# Patient Record
Sex: MALE | Race: WHITE | Employment: PART TIME | URBAN - METROPOLITAN AREA
[De-identification: names, ages, dates, MRNs, and addresses within clinical notes are randomized per-mention and may not be internally consistent; named-entity substitution may affect disease eponyms.]

---

## 2018-12-01 ENCOUNTER — HOSPITAL ENCOUNTER (EMERGENCY)
Facility: CLINIC | Age: 73
Discharge: HOME | End: 2018-12-02
Attending: EMERGENCY MEDICINE | Admitting: EMERGENCY MEDICINE
Payer: MEDICARE

## 2018-12-01 DIAGNOSIS — S00.432A HEMATOMA OF LEFT PINNA, INITIAL ENCOUNTER: ICD-10-CM

## 2018-12-01 DIAGNOSIS — S01.312A LACERATION OF AURICLE OF LEFT EAR, INITIAL ENCOUNTER: ICD-10-CM
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
## 2018-12-01 DIAGNOSIS — S00.83XA CONTUSION OF FACE, SCALP AND NECK, INITIAL ENCOUNTER: ICD-10-CM

## 2018-12-01 DIAGNOSIS — S00.03XA CONTUSION OF FACE, SCALP AND NECK, INITIAL ENCOUNTER: ICD-10-CM

## 2018-12-01 DIAGNOSIS — S09.90XA CLOSED HEAD INJURY, INITIAL ENCOUNTER: ICD-10-CM

## 2018-12-01 DIAGNOSIS — S39.012A STRAIN OF LUMBAR REGION, INITIAL ENCOUNTER: ICD-10-CM

## 2018-12-01 DIAGNOSIS — S10.93XA CONTUSION OF FACE, SCALP AND NECK, INITIAL ENCOUNTER: ICD-10-CM

## 2018-12-01 LAB
ANION GAP SERPL CALCULATED.3IONS-SCNC: 7 MMOL/L (ref 3–14)
BASOPHILS # BLD AUTO: 0 10E9/L (ref 0–0.2)
BASOPHILS NFR BLD AUTO: 0.3 %
BUN SERPL-MCNC: 22 MG/DL (ref 7–30)
CALCIUM SERPL-MCNC: 8.7 MG/DL (ref 8.5–10.1)
CHLORIDE SERPL-SCNC: 105 MMOL/L (ref 94–109)
CO2 SERPL-SCNC: 26 MMOL/L (ref 20–32)
CREAT SERPL-MCNC: 1.45 MG/DL (ref 0.66–1.25)
DIFFERENTIAL METHOD BLD: ABNORMAL
EOSINOPHIL # BLD AUTO: 0.1 10E9/L (ref 0–0.7)
EOSINOPHIL NFR BLD AUTO: 2.3 %
ERYTHROCYTE [DISTWIDTH] IN BLOOD BY AUTOMATED COUNT: 12.3 % (ref 10–15)
ETHANOL SERPL-MCNC: 0.18 G/DL
GFR SERPL CREATININE-BSD FRML MDRD: 48 ML/MIN/1.7M2
GLUCOSE SERPL-MCNC: 99 MG/DL (ref 70–99)
HCT VFR BLD AUTO: 37.2 % (ref 40–53)
HGB BLD-MCNC: 12.8 G/DL (ref 13.3–17.7)
IMM GRANULOCYTES # BLD: 0 10E9/L (ref 0–0.4)
IMM GRANULOCYTES NFR BLD: 0.3 %
LYMPHOCYTES # BLD AUTO: 2.6 10E9/L (ref 0.8–5.3)
LYMPHOCYTES NFR BLD AUTO: 42.6 %
MCH RBC QN AUTO: 32.8 PG (ref 26.5–33)
MCHC RBC AUTO-ENTMCNC: 34.4 G/DL (ref 31.5–36.5)
MCV RBC AUTO: 95 FL (ref 78–100)
MONOCYTES # BLD AUTO: 0.6 10E9/L (ref 0–1.3)
MONOCYTES NFR BLD AUTO: 10 %
NEUTROPHILS # BLD AUTO: 2.7 10E9/L (ref 1.6–8.3)
NEUTROPHILS NFR BLD AUTO: 44.5 %
NRBC # BLD AUTO: 0 10*3/UL
NRBC BLD AUTO-RTO: 0 /100
PLATELET # BLD AUTO: 176 10E9/L (ref 150–450)
POTASSIUM SERPL-SCNC: 4.1 MMOL/L (ref 3.4–5.3)
RBC # BLD AUTO: 3.9 10E12/L (ref 4.4–5.9)
SODIUM SERPL-SCNC: 138 MMOL/L (ref 133–144)
WBC # BLD AUTO: 6.1 10E9/L (ref 4–11)

## 2018-12-01 PROCEDURE — 99285 EMERGENCY DEPT VISIT HI MDM: CPT | Mod: 25

## 2018-12-01 PROCEDURE — 86900 BLOOD TYPING SEROLOGIC ABO: CPT | Performed by: EMERGENCY MEDICINE

## 2018-12-01 PROCEDURE — 76705 ECHO EXAM OF ABDOMEN: CPT

## 2018-12-01 PROCEDURE — 86901 BLOOD TYPING SEROLOGIC RH(D): CPT | Performed by: EMERGENCY MEDICINE

## 2018-12-01 PROCEDURE — 80048 BASIC METABOLIC PNL TOTAL CA: CPT | Performed by: EMERGENCY MEDICINE

## 2018-12-01 PROCEDURE — 12011 RPR F/E/E/N/L/M 2.5 CM/<: CPT | Mod: LT

## 2018-12-01 PROCEDURE — 80320 DRUG SCREEN QUANTALCOHOLS: CPT | Performed by: EMERGENCY MEDICINE

## 2018-12-01 PROCEDURE — 85610 PROTHROMBIN TIME: CPT | Performed by: EMERGENCY MEDICINE

## 2018-12-01 PROCEDURE — 86850 RBC ANTIBODY SCREEN: CPT | Performed by: EMERGENCY MEDICINE

## 2018-12-01 PROCEDURE — 25000128 H RX IP 250 OP 636: Performed by: EMERGENCY MEDICINE

## 2018-12-01 PROCEDURE — 85025 COMPLETE CBC W/AUTO DIFF WBC: CPT | Performed by: EMERGENCY MEDICINE

## 2018-12-01 PROCEDURE — 96361 HYDRATE IV INFUSION ADD-ON: CPT

## 2018-12-01 RX ORDER — FENTANYL CITRATE 50 UG/ML
25 INJECTION, SOLUTION INTRAMUSCULAR; INTRAVENOUS ONCE
Status: COMPLETED | OUTPATIENT
Start: 2018-12-02 | End: 2018-12-02

## 2018-12-01 RX ADMIN — SODIUM CHLORIDE 1000 ML: 9 INJECTION, SOLUTION INTRAVENOUS at 23:25

## 2018-12-01 NOTE — ED AVS SNAPSHOT
Emergency Department    [address] [place] [address]    Phone:  [phone]    Fax:  [phone]                                       Bebeto Cam   MRN: [id number]    Department:   Emergency Department   Date of Visit:  12/1/2018           After Visit Summary Signature Page     I have received my discharge instructions, and my questions have been answered. I have discussed any challenges I see with this plan with the nurse or doctor.    ..........................................................................................................................................  Patient/Patient Representative Signature      ..........................................................................................................................................  Patient Representative Print Name and Relationship to Patient    ..................................................               ................................................  Date                                   Time    ..........................................................................................................................................  Reviewed by Signature/Title    ...................................................              ..............................................  Date                                               Time          22EPIC Rev 08/18

## 2018-12-01 NOTE — ED AVS SNAPSHOT
Emergency Department    [address] [facility] [address]    Phone:  [phone]    Fax:  [phone]                                       Bebeto Cam   MRN: [id number]    Department:   Emergency Department   Date of Visit:  12/1/2018           Patient Information     Date Of Birth          1945        Your diagnoses for this visit were:     Contusion of face, scalp and neck, initial encounter     Closed head injury, initial encounter     Hematoma of left pinna, initial encounter     Laceration of auricle of left ear, initial encounter     Strain of lumbar region, initial encounter     Fall down stairs, initial encounter        You were seen by Tien Hussein MD.      Follow-up Information     Schedule an appointment as soon as possible for a visit with Clinic, [facility].    Why:  Follow up fall downstairs     Contact information:    [address] [facility] [address] [phone]          Follow up with  Emergency Department.    Specialty:  EMERGENCY MEDICINE    Why:  As needed, If symptoms worsen    Contact information:    [address] [facility] [address] [phone]        Follow up with Laron Marinelli MD. Schedule an appointment as soon as possible for a visit in 2 days.    Specialty:  Otolaryngology    Why:  Follow up ear hematoma.     Contact information:    [facility]  [address] [id number] [phone]          Discharge Instructions       You can expect to be more sore later today.  You should take Tylenol, 1000 mg every 6 hours along with 4-600 mg of ibuprofen every 6 hours for your pain.  You can add in the oxycodone for severe pain on top of this.  He should return the emergency department if you have worsening pain, or pain not improved by the above therapy.  You should also return if you develop worsening abdominal pain, chest pain or shortness of breath.    24 Hour Appointment  Hotline       To make an appointment at any [facility], call [phone] ([phone]). If you don't have a family doctor or clinic, we will help you find one. [facility] clinics are conveniently located to serve the needs of you and your family.             Review of your medicines      START taking        Dose / Directions Last dose taken    oxyCODONE 5 MG tablet   Commonly known as:  ROXICODONE   Dose:  5 mg   Quantity:  12 tablet        Take 1 tablet (5 mg) by mouth every 6 hours as needed for pain   Refills:  0          Our records show that you are taking the medicines listed below. If these are incorrect, please call your family doctor or clinic.        Dose / Directions Last dose taken    BENADRYL PO        Refills:  0                Information about OPIOIDS     PRESCRIPTION OPIOIDS: WHAT YOU NEED TO KNOW   We gave you an opioid (narcotic) pain medicine. It is important to manage your pain, but opioids are not always the best choice. You should first try all the other options your care team gave you. Take this medicine for as short a time (and as few doses) as possible.    Some activities can increase your pain, such as bandage changes or therapy sessions. It may help to take your pain medicine 30 to 60 minutes before these activities. Reduce your stress by getting enough sleep, working on hobbies you enjoy and practicing relaxation or meditation. Talk to your care team about ways to manage your pain beyond prescription opioids.    These medicines have risks:    DO NOT drive when on new or higher doses of pain medicine. These medicines can affect your alertness and reaction times, and you could be arrested for driving under the influence (DUI). If you need to use opioids long-term, talk to your care team about driving.    DO NOT operate heavy machinery    DO NOT do any other dangerous activities while taking these medicines.    DO NOT drink any alcohol while taking these medicines.     If the  opioid prescribed includes acetaminophen, DO NOT take with any other medicines that contain acetaminophen. Read all labels carefully. Look for the word  acetaminophen  or  Tylenol.  Ask your pharmacist if you have questions or are unsure.    You can get addicted to pain medicines, especially if you have a history of addiction (chemical, alcohol or substance dependence). Talk to your care team about ways to reduce this risk.    All opioids tend to cause constipation. Drink plenty of water and eat foods that have a lot of fiber, such as fruits, vegetables, prune juice, apple juice and high-fiber cereal. Take a laxative (Miralax, milk of magnesia, Colace, Senna) if you don t move your bowels at least every other day. Other side effects include upset stomach, sleepiness, dizziness, throwing up, tolerance (needing more of the medicine to have the same effect), physical dependence and slowed breathing.    Store your pills in a secure place, locked if possible. We will not replace any lost or stolen medicine. If you don t finish your medicine, please throw away (dispose) as directed by your pharmacist. The [place] [employer] has more information about safe disposal: https://www.pca.[facility].[place].us/living-green/managing-unwanted-medications        Prescriptions were sent or printed at these locations (1 Prescription)                   Other Prescriptions                Printed at Department/Unit printer (1 of 1)         oxyCODONE (ROXICODONE) 5 MG tablet                Procedures and tests performed during your visit     ABO/Rh type and screen    Alcohol level blood    Basic metabolic panel    CBC with platelets differential    CT Chest/Abdomen/Pelvis w Contrast    CT Thoracic Spine w/o Contrast    Cervical spine CT w/o contrast    Head CT w/o contrast    INR    ISTAT creatinine nursing POCT    Lumbar spine CT w/o contrast    POC US ABDOMEN LIMITED      Orders Needing Specimen Collection     None      Pending  Results     Date and Time Order Name Status Description    12/1/2018 2308 POC US ABDOMEN LIMITED In process             Pending Culture Results     No orders found for last 3 day(s).            Pending Results Instructions     If you had any lab results that were not finalized at the time of your Discharge, you can call the ED Lab Result RN at [phone]. You will be contacted by this team for any positive Lab results or changes in treatment. The nurses are available 7 days a week from 10A to 6:30P.  You can leave a message 24 hours per day and they will return your call.        Test Results From Your Hospital Stay        12/1/2018 11:50 PM      Component Results     Component Value Ref Range & Units Status    Ethanol g/dL 0.18 (H) <0.01 g/dL Final         12/1/2018 11:34 PM      Component Results     Component Value Ref Range & Units Status    WBC 6.1 4.0 - 11.0 10e9/L Final    RBC Count 3.90 (L) 4.4 - 5.9 10e12/L Final    Hemoglobin 12.8 (L) 13.3 - 17.7 g/dL Final    Hematocrit 37.2 (L) 40.0 - 53.0 % Final    MCV 95 78 - 100 fl Final    MCH 32.8 26.5 - 33.0 pg Final    MCHC 34.4 31.5 - 36.5 g/dL Final    RDW 12.3 10.0 - 15.0 % Final    Platelet Count 176 150 - 450 10e9/L Final    Diff Method Automated Method  Final    % Neutrophils 44.5 % Final    % Lymphocytes 42.6 % Final    % Monocytes 10.0 % Final    % Eosinophils 2.3 % Final    % Basophils 0.3 % Final    % Immature Granulocytes 0.3 % Final    Nucleated RBCs 0 0 /100 Final    Absolute Neutrophil 2.7 1.6 - 8.3 10e9/L Final    Absolute Lymphocytes 2.6 0.8 - 5.3 10e9/L Final    Absolute Monocytes 0.6 0.0 - 1.3 10e9/L Final    Absolute Eosinophils 0.1 0.0 - 0.7 10e9/L Final    Absolute Basophils 0.0 0.0 - 0.2 10e9/L Final    Abs Immature Granulocytes 0.0 0 - 0.4 10e9/L Final    Absolute Nucleated RBC 0.0  Final         12/2/2018 12:12 AM      Component Results     Component Value Ref Range & Units Status    ABO O  Final    RH(D) Neg  Final    Antibody Screen Neg   Final    Test Valid Only At [facility]     Final    Specimen Expires 12/04/2018  Final         12/1/2018 11:50 PM      Component Results     Component Value Ref Range & Units Status    Sodium 138 133 - 144 mmol/L Final    Potassium 4.1 3.4 - 5.3 mmol/L Final    Chloride 105 94 - 109 mmol/L Final    Carbon Dioxide 26 20 - 32 mmol/L Final    Anion Gap 7 3 - 14 mmol/L Final    Glucose 99 70 - 99 mg/dL Final    Urea Nitrogen 22 7 - 30 mg/dL Final    Creatinine 1.45 (H) 0.66 - 1.25 mg/dL Final    GFR Estimate 48 (L) >60 mL/min/1.7m2 Final    Non  GFR Calc    GFR Estimate If Black 58 (L) >60 mL/min/1.7m2 Final    African American GFR Calc    Calcium 8.7 8.5 - 10.1 mg/dL Final         12/1/2018 11:08 PM      Result not yet available     Exam Begun         12/2/2018  6:10 AM      Narrative     CT SCAN OF THE HEAD WITHOUT CONTRAST   12/2/2018 12:58 AM     HISTORY: Fall.    TECHNIQUE:  Axial images of the head and coronal reformations without  IV contrast material. Radiation dose for this scan was reduced using  automated exposure control, adjustment of the mA and/or kV according  to patient size, or iterative reconstruction technique.    COMPARISON: None.    FINDINGS:  There is generalized atrophy of the brain.  There is low  attenuation in the white matter of the cerebral hemispheres consistent  with sequelae of small vessel ischemic disease. There is no evidence  of intracranial hemorrhage, mass, acute infarct or anomaly.     The visualized portions of the sinuses and mastoids appear normal.  Large left scalp hematoma. No bone fracture.        Impression     IMPRESSION: No acute brain abnormality.      NICHO SANDOVAL MD         12/2/2018  6:12 AM      Narrative     CT CERVICAL SPINE WITHOUT CONTRAST  12/2/2018 12:58 AM      HISTORY: Fall.    TECHNIQUE: Multiplanar imaging of the cervical spine without  intravenous contrast. Radiation dose for this scan was reduced using  automated exposure control, adjustment  of the mA and/or kV according  to patient size, or iterative reconstruction technique.     COMPARISON:  None.    FINDINGS: There is no acute fracture or traumatic malalignment. There  is multilevel degenerative disc and facet disease. There is no  significant spinal stenosis. The paraspinal soft tissues show no acute  abnormalities. There is atherosclerotic calcification of the carotid  arterial system. Scarring at the lung apices.        Impression     IMPRESSION: No acute fracture or malalignment.    NICHO SANDOVAL MD         12/2/2018  6:11 AM      Narrative     CT CHEST/ABDOMEN/PELVIS WITH CONTRAST  12/2/2018 1:16 AM    HISTORY:  Fall, left flank pain.    TECHNIQUE: CT scan obtained of the chest, abdomen, and pelvis with  oral and IV contrast. 72 mL Isovue-370 injected. Radiation dose for  this scan was reduced using automated exposure control, adjustment of  the mA and/or kV according to patient size, or iterative  reconstruction technique.    COMPARISON:  11/2/2005.    FINDINGS:    Chest: There is scarring at the lung apices. Dependent atelectasis  bilaterally. No pneumothorax or pleural effusion. There is no  mediastinal, hilar or axillary lymph node enlargement. There are  coronary artery atherosclerotic calcifications. The heart is at the  upper limits of normal in size.    ABDOMEN: There is diffuse fatty infiltration of the liver. Small  probable cysts in the right lobe of the liver near the gallbladder  fossa. The portal vein is patent. The gallbladder, spleen, pancreas  and adrenal glands are normal in appearance. There are parapelvic  cysts in both kidneys. Tiny cortical cyst in the mid right kidney.  There are several intrarenal stones bilaterally. The largest is in the  lower pole of the left kidney measuring 0.5 cm. There is no ureteral  stone or hydronephrosis on either side. There is no abdominal or  pelvic lymph node enlargement. There is atherosclerotic calcification  of the aorta and its  branches. No aneurysm.    Pelvis: There are colonic diverticula without acute diverticulitis. No  bowel obstruction or inflammation. The urinary bladder is  well-distended and appears normal. There are prostatic calcifications.  A few pelvic phleboliths. No abdominal wall hernia. There is no acute  bone fracture. Degenerative disease in the spine and hips.        Impression     IMPRESSION:  1. No acute traumatic abnormality.  2. Coronary artery atherosclerotic calcifications.  3. Bilateral renal stones. No ureteral stone or hydronephrosis.  4. Fatty infiltration of the liver.  5. Colonic diverticula without acute diverticulitis.     NICHO SANDOVAL MD         12/2/2018  6:08 AM      Narrative     CT THORACIC SPINE WITHOUT CONTRAST  12/2/2018 1:33 AM      HISTORY: Fall.    TECHNIQUE: Multiplanar imaging of the thoracic spine without  intravenous contrast. Radiation dose for this scan was reduced using  automated exposure control, adjustment of the mA and/or kV according  to patient size, or iterative reconstruction technique.     COMPARISON:  None.    FINDINGS: There is no acute fracture or traumatic malalignment. There  is mild multilevel degenerative disease. The paraspinal soft tissues  show no acute abnormalities. There is dependent atelectasis in the  visualized lungs.        Impression     IMPRESSION: No acute fracture or malalignment.    NICHO SANDOVAL MD         12/2/2018  6:08 AM      Narrative     CT LUMBAR SPINE WITHOUT CONTRAST  12/2/2018 1:36 AM      HISTORY: Fall.    TECHNIQUE: Multiplanar imaging of the lumbar spine without intravenous  contrast. Radiation dose for this scan was reduced using automated  exposure control, adjustment of the mA and/or kV according to patient  size, or iterative reconstruction technique.     COMPARISON:  None.    FINDINGS: There is no acute fracture or malalignment. There is  multilevel degenerative disc and facet disease. There is no  significant spinal stenosis. There  is a central disc bulge at L5-S1.  There is degenerative disease in the SI joints bilaterally with fusion  at the joint superiorly. The paraspinal soft tissues show no acute  abnormalities. There are bilateral intrarenal stones and parapelvic  cysts.        Impression     IMPRESSION: No acute traumatic abnormality.    NICHO SANDOVAL MD         12/2/2018 12:14 AM      Component Results     Component Value Ref Range & Units Status    INR 1.00 0.86 - 1.14 Final                Clinical Quality Measure: Blood Pressure Screening     Your blood pressure was checked while you were in the emergency department today. The last reading we obtained was  BP: 160/76 . Please read the guidelines below about what these numbers mean and what you should do about them.  If your systolic blood pressure (the top number) is less than 120 and your diastolic blood pressure (the bottom number) is less than 80, then your blood pressure is normal. There is nothing more that you need to do about it.  If your systolic blood pressure (the top number) is 120-139 or your diastolic blood pressure (the bottom number) is 80-89, your blood pressure may be higher than it should be. You should have your blood pressure rechecked within a year by a primary care provider.  If your systolic blood pressure (the top number) is 140 or greater or your diastolic blood pressure (the bottom number) is 90 or greater, you may have high blood pressure. High blood pressure is treatable, but if left untreated over time it can put you at risk for heart attack, stroke, or kidney failure. You should have your blood pressure rechecked by a primary care provider within the next 4 weeks.  If your provider in the emergency department today gave you specific instructions to follow-up with your doctor or provider even sooner than that, you should follow that instruction and not wait for up to 4 weeks for your follow-up visit.        Thank you for choosing [facility]       Thank  "you for choosing [facility] for your care. Our goal is always to provide you with excellent care. Hearing back from our patients is one way we can continue to improve our services. Please take a few minutes to complete the written survey that you may receive in the mail after you visit with us. Thank you!        [employer] Information     [employer] lets you send messages to your doctor, view your test results, renew your prescriptions, schedule appointments and more. To sign up, go to [email or web address] . Click on \"Log in\" on the left side of the screen, which will take you to the Welcome page. Then click on \"Sign up Now\" on the right side of the page.     You will be asked to enter the access code listed below, as well as some personal information. Please follow the directions to create your username and password.     Your access code is: [email or web address]  Expires: 3/2/2019  2:22 AM     Your access code will  in 90 days. If you need help or a new code, please call your [facility] clinic or [phone].        Care EveryWhere ID     This is your Care EveryWhere ID. This could be used by other organizations to access your [facility] medical records  [id number]        Equal Access to Services     ELLIE AGUILAR : Rachael Purvis, wageraldda radha, qaybta kaalmada adeabiola, marnie toney. So [facility] [phone].    ATENCIÓN: Si habla español, tiene a flores disposición servicios gratuitos de asistencia lingüística. Llame al [phone].    We comply with applicable federal civil rights laws and [place] laws. We do not discriminate on the basis of race, color, national origin, age, disability, sex, sexual orientation, or gender identity.            After Visit Summary       This is your record. Keep this with you and show to your community pharmacist(s) and doctor(s) at your next visit.                  "

## 2018-12-02 ENCOUNTER — APPOINTMENT (OUTPATIENT)
Dept: CT IMAGING | Facility: CLINIC | Age: 73
End: 2018-12-02
Attending: EMERGENCY MEDICINE
Payer: MEDICARE

## 2018-12-02 VITALS
HEART RATE: 62 BPM | DIASTOLIC BLOOD PRESSURE: 76 MMHG | OXYGEN SATURATION: 97 % | HEIGHT: 67 IN | SYSTOLIC BLOOD PRESSURE: 160 MMHG | TEMPERATURE: 98.3 F | WEIGHT: 164 LBS | RESPIRATION RATE: 20 BRPM | BODY MASS INDEX: 25.74 KG/M2

## 2018-12-02 LAB
ABO + RH BLD: NORMAL
ABO + RH BLD: NORMAL
BLD GP AB SCN SERPL QL: NORMAL
BLOOD BANK CMNT PATIENT-IMP: NORMAL
INR PPP: 1 (ref 0.86–1.14)
SPECIMEN EXP DATE BLD: NORMAL

## 2018-12-02 PROCEDURE — 70450 CT HEAD/BRAIN W/O DYE: CPT

## 2018-12-02 PROCEDURE — 25000132 ZZH RX MED GY IP 250 OP 250 PS 637: Mod: GY | Performed by: EMERGENCY MEDICINE

## 2018-12-02 PROCEDURE — 96374 THER/PROPH/DIAG INJ IV PUSH: CPT | Mod: 59

## 2018-12-02 PROCEDURE — 72131 CT LUMBAR SPINE W/O DYE: CPT

## 2018-12-02 PROCEDURE — 72125 CT NECK SPINE W/O DYE: CPT

## 2018-12-02 PROCEDURE — 25000128 H RX IP 250 OP 636: Performed by: EMERGENCY MEDICINE

## 2018-12-02 PROCEDURE — 72128 CT CHEST SPINE W/O DYE: CPT

## 2018-12-02 PROCEDURE — 74177 CT ABD & PELVIS W/CONTRAST: CPT

## 2018-12-02 PROCEDURE — A9270 NON-COVERED ITEM OR SERVICE: HCPCS | Mod: GY | Performed by: EMERGENCY MEDICINE

## 2018-12-02 PROCEDURE — 25000125 ZZHC RX 250: Performed by: EMERGENCY MEDICINE

## 2018-12-02 RX ORDER — OXYCODONE HYDROCHLORIDE 5 MG/1
5 TABLET ORAL EVERY 6 HOURS PRN
Qty: 12 TABLET | Refills: 0 | Status: SHIPPED | OUTPATIENT
Start: 2018-12-02

## 2018-12-02 RX ORDER — OXYCODONE HYDROCHLORIDE 5 MG/1
5 TABLET ORAL ONCE
Status: COMPLETED | OUTPATIENT
Start: 2018-12-02 | End: 2018-12-02

## 2018-12-02 RX ORDER — ACETAMINOPHEN 325 MG/1
975 TABLET ORAL ONCE
Status: COMPLETED | OUTPATIENT
Start: 2018-12-02 | End: 2018-12-02

## 2018-12-02 RX ORDER — IOPAMIDOL 755 MG/ML
72 INJECTION, SOLUTION INTRAVASCULAR ONCE
Status: COMPLETED | OUTPATIENT
Start: 2018-12-02 | End: 2018-12-02

## 2018-12-02 RX ORDER — IBUPROFEN 600 MG/1
600 TABLET, FILM COATED ORAL ONCE
Status: COMPLETED | OUTPATIENT
Start: 2018-12-02 | End: 2018-12-02

## 2018-12-02 RX ADMIN — ACETAMINOPHEN 975 MG: 325 TABLET, FILM COATED ORAL at 04:10

## 2018-12-02 RX ADMIN — IOPAMIDOL 72 ML: 755 INJECTION, SOLUTION INTRAVENOUS at 01:09

## 2018-12-02 RX ADMIN — IBUPROFEN 600 MG: 600 TABLET, FILM COATED ORAL at 04:10

## 2018-12-02 RX ADMIN — FENTANYL CITRATE 25 MCG: 50 INJECTION, SOLUTION INTRAMUSCULAR; INTRAVENOUS at 00:05

## 2018-12-02 RX ADMIN — SODIUM CHLORIDE 96 ML: 9 INJECTION, SOLUTION INTRAVENOUS at 01:09

## 2018-12-02 RX ADMIN — OXYCODONE HYDROCHLORIDE 5 MG: 5 TABLET ORAL at 04:10

## 2018-12-02 ASSESSMENT — ENCOUNTER SYMPTOMS
WOUND: 1
BACK PAIN: 1
ARTHRALGIAS: 1
MYALGIAS: 1
HEADACHES: 1

## 2018-12-02 NOTE — ED NOTES
Per ERT, pt ambulated fairly well.  Needed some assistance.  Had some back pain at the end of the walk.

## 2018-12-02 NOTE — ED PROVIDER NOTES
"  History     Chief Complaint:  Fall    HPI   Bebeto Cam is a 73 year old male with a complex medical history who presents to the ED via EMS for evaluation after a fall. The patient states that he was drinking this evening, and per report, fell down approximately 12 steps, which were noted to be steep, sustaining injury to his head and lower back. Witnesses at bedside state that the patient was unconscious for the duration of a minute. He is alert and verbal in the ED. The patient presents with a laceration to his left ear, and endorses head pain, left shoulder pain as well as severe lower left back pain. He is an otherwise poor historian, and in the room can recall very few of the details, and repeating the words \" I am a mess.\" Of note the patient takes a daily aspirin.     Allergies:  Vicodin [Hydrocodone-Acetaminophen]    Medications:    Aspirin  Revatio  Celexa  Zestril  Ativan  Zocor  Prevacid    Past Medical History:    Anxiety  Hypertension  Hyperlipidemia  Lumbago  GERD  Left ventricular hypertrophy    Past Surgical History:    Nasal septum   Shoulder  Knee surgery  Adenoidectomy  Tonsillectomy     Family History:    History reviewed. No pertinent family history.     Social History:  The patient was accompanied to the ED by carlin.  Smoking Status: Never  Smokeless Tobacco: Never  Alcohol Use: Yes  Marital Status:  Single [1]     Review of Systems   Musculoskeletal: Positive for arthralgias, back pain and myalgias.   Skin: Positive for wound.   Neurological: Positive for syncope and headaches.   All other systems reviewed and are negative.    Physical Exam     Patient Vitals for the past 24 hrs:   BP Temp Temp src Pulse Heart Rate Resp SpO2 Height Weight   12/02/18 0321 160/76 - - - - - 97 % - -   12/02/18 0315 - - - - - - 97 % - -   12/02/18 0245 - - - - - - 97 % - -   12/02/18 0215 - - - - - - 96 % - -   12/02/18 0200 - - - - - - 98 % - -   12/02/18 0130 - - - - - - 97 % - -   12/02/18 0115 - - - - - " "- 99 % - -   12/02/18 0111 134/66 - - - 75 20 95 % - -   12/02/18 0108 134/66 - - - - - 95 % - -   12/01/18 2355 140/69 - - - - - 97 % - -   12/01/18 2345 - - - - - - 99 % - -   12/01/18 2315 - - - - - - 98 % - -   12/01/18 2302 133/77 98.3  F (36.8  C) Oral 62 62 20 98 % - -   12/01/18 2256 - - - - - - - 1.702 m (5' 7\") 74.4 kg (164 lb)     Physical Exam  Constitutional: Alert, attentive, GCS 15  HENT:    Head: Left lateral parietal scalp hematoma, no laceration   Ears: no hemotympanum, 2 cm posterior left auricular laceration with associated auricular hematoma   Nose: Nose normal.    Mouth/Throat: Oropharynx is clear, mucous membranes are moist, no blood in oral cavity, no dental subluxation  Neck:  no midline tenderness, ROM full  Eyes: EOM are normal, conjugate gaze, pupils symmetric reactive, nystagmus noted  CV: regular rate and rhythm; no murmurs  Chest: Effort normal and breath sounds normal, symmetric bilaterally.  No crepitus  GI:  Non-tender without guarding or rebound  Back: No T or L spine tenderness, no step offs, diffuse lumbar paraspinal muscle tenderness without contusions or abrasion  MSK: No long bone tenderness or deformities.  Muscle compartments soft.   Neurological: Alert, attentive.  and plantar strength 5/5.  Sensation intact to light touch in in distal BLE and BUE.  Mildly slurred speech  Skin: Skin is warm and dry.      Emergency Department Course     Imaging:  Radiology findings were communicated with the patient who voiced understanding of the findings.  Head CT w/o Contrast:  IMPRESSION: No acute brain abnormality.      Per radiology    Cervical Spine CT w/o Contrast:  IMPRESSION: No acute fracture or malalignment.    Per radiology    CT Chest/Abdomen/Pelvis w Contrast:  IMPRESSION:  1. No acute traumatic abnormality.  2. Coronary artery atherosclerotic calcifications.  3. Bilateral renal stones. No ureteral stone or hydronephrosis.  4. Fatty infiltration of the liver.  5. Colonic " diverticula without acute diverticulitis.     Per radiology    Lumbar Spine CT w/o Contrast:  IMPRESSION: No acute traumatic abnormality.    Per radiology    POC US Abdomen Limited:  Final result by Tien Hussein MD (12/02/18 07:53:20)     Impression:     PROCEDURE NOTE: ED BEDSIDE LIMITED ULTRASOUND  Name of the study: E-FAST Exam  Performed by: Tien Hussein MD  Indications:Blunt Trauma   Body Location / Organs Imaged: Four quadrants (perihepatic, perisplenic, pelvic, pericardial) of the abdomen were scanned; the exam was continued to the chest using the linear probe, where the lungs were evaluated.  Findings: Four quadrants (perihepatic, perisplenic, pelvic, pericardial) were negative for free fluid. Positive lung sliding sign and comet tail sign noted to the bilateral anterior chest.  Interpretation: No evidence of acute hemoperitoneum, pericardial effusion or pneumothorax.  Archiving of images: Images were saved digitally to the internal hard drive of the ultrasound machine.         CT Thoracic Spine w/o Contrast:  IMPRESSION: No acute fracture or malalignment.    Per radiology     Laboratory:  Laboratory findings were communicated with the patient who voiced understanding of the findings.    CBC: HGB 12.8 (L), o/w WNL (WBC 6.1, )  BMP: Creatinine 1.45 (H), GFR 48 (L), o/w WNL    Alcohol level blood: 0.18 (H)  INR: 1.00    Procedures:    Procedure Auricular Nerve Block  Indication: Auricular laceration and hematoma   Consent: Risks, benefits, and alternatives were discussed with the patient and verbal consent for procedure was obtained.   Timeout: Universal protocol was followed. Site/side and availability of correct equipment.  Technique: Aspiration prior to injection was performed to confirm no intravascular injection.  A ricky pre-and postauricular injection of 0.5% bupivacaine was placed.      Narrative: Procedure: Laceration Repair        LACERATION:  A simple clean 2 cm  laceration.      LOCATION:  Post auricular       ANESTHESIA:  Local using 0.5% bupivacaine total of 5 mLs      PREPARATION:  Irrigation and Scrubbing with Normal Saline and Shur Clens      DEBRIDEMENT:  no debridement      CLOSURE:  Wound was closed with One Layer.  Skin closed with 2 X 5.0 Fast Gut using interrupted sutures.      Narrative: Procedure: Hematoma Evacuation         Indication: Hematoma of left pinna.      LOCATION: Left ear      ANESTHESIA: Auricular block as above.      PREPARATION: Skin was cleaned thoroughly with Betadine.      Technique: Single incision was made using 11 blade along the crease of the left pinna, approximately 1 cm and thin bloody drainage was achieved.  A pressure dressing was made with 2 dental gauze sutured in place using a single horizontal mattress on the anterior posterior aspects of the pinna.  Wound was hemostatic and a pressure dressing was applied.      Interventions:  2325 - NS Bolus 1,000mL IV  0005 - Sublimaze injection 25 mcg IV  0109 - Isovue-370 solution 72 mLs IV  0410 - Tylenol tablet 975 mg PO  0410 - Advil tablet 600 mg PO  0410 - Oxycodone tablet 5 mg PO    Emergency Department Course:  Nursing notes and vitals reviewed.    2309: I performed an exam of the patient as documented above.     0337: Patient rechecked and updated.     0440: Patient rechecked and updated. I performed laceration repair and hematoma evacuation procedures as noted above.     0556: Patient ambulatory with some continued lower back pain per nurse report. Patient rechecked and updated.     Findings and plan explained to the Patient. Patient discharged home with instructions regarding supportive care, medications, and reasons to return. The importance of close follow-up was reviewed.     Impression & Plan      Medical Decision Makin-year-old male with intoxicated fall downstairs with question of LOC presenting for evaluation of injuries, exam notable for left temporal hematoma,  posterior ear laceration was associated auricular hematoma and diffuse low back paraspinal muscle tenderness.  Patient was clinically intoxicated on exam but neurovascularly intact.  Immediate bedside ultrasound was performed, negative fast with bilateral sliding sign present.  Given his degree of intoxication, CT head, cervical/thoracic/lumbar spine and chest and pelvis were obtained.  No evidence of fracture, intracranial hemorrhage, intra-abdominal hemorrhage was noted.  Patient's pain was controlled initially with IV pain medication and then Tylenol/ibuprofen/oxycodone.  His posterior regular laceration was repaired using 5-0 fast gut, please see above for further details.  His hematoma was evacuated with a single linear incision and pressure dressing was sutured in place and a pressure wrap was applied.  Patient was ambulatory in the ED with a steady gait, he did have diffuse low back and left lateral chest wall tenderness more consistent that suspect occult rib fracture.  He was steady on his feet, speech clear with no other injuries identified.  Plan is for close follow-up with ENT for recheck of his ears and suture removal of the pressure dressing.  Return precautions were reviewed including increasing pain not improved with Tylenol/ibuprofen and a short course of oxycodone.  I did recommend cessation of alcohol and patient agrees that he drinks more often than he wishes.  I did recommend follow-up with primary care doctor for recheck as well.  She was discharged home with planned right by his son who was en route.     Diagnosis:    ICD-10-CM    1. Contusion of face, scalp and neck, initial encounter S00.83XA     S00.03XA     S10.93XA    2. Closed head injury, initial encounter S09.90XA    3. Hematoma of left pinna, initial encounter S00.432A    4. Laceration of auricle of left ear, initial encounter S01.312A    5. Strain of lumbar region, initial encounter S39.012A    6. Fall down stairs, initial encounter  W10.8XXA        Disposition:  discharged to home    Discharge Medications:  Discharge Medication List as of 12/2/2018  6:24 AM      START taking these medications    Details   oxyCODONE (ROXICODONE) 5 MG tablet Take 1 tablet (5 mg) by mouth every 6 hours as needed for pain, Disp-12 tablet, R-0, Local Print             Tien Hsusein MD   [employer] [facility]  7:59 AM 12/02/18     Dinorah Boudreaux  12/1/2018    EMERGENCY DEPARTMENT  I, Dinorah Boudreaux am serving as a scribe at 11:09 PM on 12/1/2018 to document services personally performed by Tien Hussein MD based on my observations and the provider's statements to me.       Tien Hussein MD  12/02/18 0800

## 2018-12-02 NOTE — DISCHARGE INSTRUCTIONS
You can expect to be more sore later today.  You should take Tylenol, 1000 mg every 6 hours along with 4-600 mg of ibuprofen every 6 hours for your pain.  You can add in the oxycodone for severe pain on top of this.  He should return the emergency department if you have worsening pain, or pain not improved by the above therapy.  You should also return if you develop worsening abdominal pain, chest pain or shortness of breath.

## 2018-12-02 NOTE — ED NOTES
Bed: ED11  Expected date: 12/1/18  Expected time: 10:48 PM  Means of arrival: Ambulance  Comments:  Shea M1 73M fall down stairs